# Patient Record
Sex: FEMALE | Race: WHITE | NOT HISPANIC OR LATINO | URBAN - METROPOLITAN AREA
[De-identification: names, ages, dates, MRNs, and addresses within clinical notes are randomized per-mention and may not be internally consistent; named-entity substitution may affect disease eponyms.]

---

## 2017-04-25 ENCOUNTER — IMPORTED ENCOUNTER (OUTPATIENT)
Dept: URBAN - METROPOLITAN AREA CLINIC 50 | Facility: CLINIC | Age: 77
End: 2017-04-25

## 2017-04-25 NOTE — PATIENT DISCUSSION
"""Dr. Latrelle Lefort to review testing with pt. "" ""Reassured patient that intraocular pressures (IOPs) are at target levels and other ocular findings are stable. Continue present management and/or medication(s).  Follow up as directed. """

## 2017-05-01 ENCOUNTER — IMPORTED ENCOUNTER (OUTPATIENT)
Dept: URBAN - METROPOLITAN AREA CLINIC 50 | Facility: CLINIC | Age: 77
End: 2017-05-01

## 2017-05-23 ENCOUNTER — IMPORTED ENCOUNTER (OUTPATIENT)
Dept: URBAN - METROPOLITAN AREA CLINIC 50 | Facility: CLINIC | Age: 77
End: 2017-05-23

## 2017-10-31 ENCOUNTER — IMPORTED ENCOUNTER (OUTPATIENT)
Dept: URBAN - METROPOLITAN AREA CLINIC 50 | Facility: CLINIC | Age: 77
End: 2017-10-31

## 2018-02-27 ENCOUNTER — IMPORTED ENCOUNTER (OUTPATIENT)
Dept: URBAN - METROPOLITAN AREA CLINIC 50 | Facility: CLINIC | Age: 78
End: 2018-02-27

## 2018-05-08 ENCOUNTER — IMPORTED ENCOUNTER (OUTPATIENT)
Dept: URBAN - METROPOLITAN AREA CLINIC 50 | Facility: CLINIC | Age: 78
End: 2018-05-08

## 2018-06-02 NOTE — PATIENT DISCUSSION
"""Cataract(s) are not visually significant for cataract surgery.  Monitor by regular examinations. """ no abrasions, no jaundice, no lesions, no pruritis, and no rashes.

## 2018-06-12 ENCOUNTER — IMPORTED ENCOUNTER (OUTPATIENT)
Dept: URBAN - METROPOLITAN AREA CLINIC 50 | Facility: CLINIC | Age: 78
End: 2018-06-12

## 2018-07-09 ENCOUNTER — IMPORTED ENCOUNTER (OUTPATIENT)
Dept: URBAN - METROPOLITAN AREA CLINIC 50 | Facility: CLINIC | Age: 78
End: 2018-07-09

## 2018-07-11 ENCOUNTER — IMPORTED ENCOUNTER (OUTPATIENT)
Dept: URBAN - METROPOLITAN AREA CLINIC 50 | Facility: CLINIC | Age: 78
End: 2018-07-11

## 2018-07-19 ENCOUNTER — IMPORTED ENCOUNTER (OUTPATIENT)
Dept: URBAN - METROPOLITAN AREA CLINIC 50 | Facility: CLINIC | Age: 78
End: 2018-07-19

## 2018-07-26 ENCOUNTER — IMPORTED ENCOUNTER (OUTPATIENT)
Dept: URBAN - METROPOLITAN AREA CLINIC 50 | Facility: CLINIC | Age: 78
End: 2018-07-26

## 2018-08-07 ENCOUNTER — IMPORTED ENCOUNTER (OUTPATIENT)
Dept: URBAN - METROPOLITAN AREA CLINIC 50 | Facility: CLINIC | Age: 78
End: 2018-08-07

## 2018-08-07 NOTE — PATIENT DISCUSSION
"""S/P IOL OD: SN6AT4 +18.5 @ 14Âº +Femto/Arcs +TM +iStent. Continue post operative instructions and drops per schedule.  Continue glaucoma drops as prescribed."""

## 2018-09-21 ENCOUNTER — IMPORTED ENCOUNTER (OUTPATIENT)
Dept: URBAN - METROPOLITAN AREA CLINIC 50 | Facility: CLINIC | Age: 78
End: 2018-09-21

## 2018-10-31 ENCOUNTER — IMPORTED ENCOUNTER (OUTPATIENT)
Dept: URBAN - METROPOLITAN AREA CLINIC 50 | Facility: CLINIC | Age: 78
End: 2018-10-31

## 2018-11-02 ENCOUNTER — IMPORTED ENCOUNTER (OUTPATIENT)
Dept: URBAN - METROPOLITAN AREA CLINIC 50 | Facility: CLINIC | Age: 78
End: 2018-11-02

## 2018-11-13 ENCOUNTER — IMPORTED ENCOUNTER (OUTPATIENT)
Dept: URBAN - METROPOLITAN AREA CLINIC 50 | Facility: CLINIC | Age: 78
End: 2018-11-13

## 2018-11-13 NOTE — PATIENT DISCUSSION
"""Continue Artificial tears both eyes two - four times a day ."" ""Continue Gel drops both eyes at bedtime . "" ""Continue Warm compresses both eyes twice a day . """

## 2018-11-15 ENCOUNTER — IMPORTED ENCOUNTER (OUTPATIENT)
Dept: URBAN - METROPOLITAN AREA CLINIC 50 | Facility: CLINIC | Age: 78
End: 2018-11-15

## 2018-11-20 ENCOUNTER — IMPORTED ENCOUNTER (OUTPATIENT)
Dept: URBAN - METROPOLITAN AREA CLINIC 50 | Facility: CLINIC | Age: 78
End: 2018-11-20

## 2018-11-30 ENCOUNTER — IMPORTED ENCOUNTER (OUTPATIENT)
Dept: URBAN - METROPOLITAN AREA CLINIC 50 | Facility: CLINIC | Age: 78
End: 2018-11-30

## 2018-12-13 ENCOUNTER — IMPORTED ENCOUNTER (OUTPATIENT)
Dept: URBAN - METROPOLITAN AREA CLINIC 50 | Facility: CLINIC | Age: 78
End: 2018-12-13

## 2019-02-19 ENCOUNTER — IMPORTED ENCOUNTER (OUTPATIENT)
Dept: URBAN - METROPOLITAN AREA CLINIC 50 | Facility: CLINIC | Age: 79
End: 2019-02-19

## 2019-02-19 NOTE — PATIENT DISCUSSION
""" to review testing with patient. "" ""Continue Timolol GFS left eye in the morning ."" ""Continue Travatan Z both eyes at bedtime ."""

## 2019-03-05 ENCOUNTER — IMPORTED ENCOUNTER (OUTPATIENT)
Dept: URBAN - METROPOLITAN AREA CLINIC 50 | Facility: CLINIC | Age: 79
End: 2019-03-05

## 2019-03-05 NOTE — PATIENT DISCUSSION
"""Continue Artificial tears both eyes two - four times a day ."" ""Continue Gel drops both eyes at bedtime . "" ""Continue Warm compresses both eyes twice a day ."" ""Continue Lotemax Gel both eyes two - four times a day

## 2019-09-12 ENCOUNTER — IMPORTED ENCOUNTER (OUTPATIENT)
Dept: URBAN - METROPOLITAN AREA CLINIC 50 | Facility: CLINIC | Age: 79
End: 2019-09-12

## 2019-10-17 ENCOUNTER — IMPORTED ENCOUNTER (OUTPATIENT)
Dept: URBAN - METROPOLITAN AREA CLINIC 50 | Facility: CLINIC | Age: 79
End: 2019-10-17

## 2019-10-17 NOTE — PATIENT DISCUSSION
"""Moderate Primary Open Angle Glaucoma Normal vision: sees adequately in most situations; can see medication labels, newsprint

## 2019-10-23 ENCOUNTER — IMPORTED ENCOUNTER (OUTPATIENT)
Dept: URBAN - METROPOLITAN AREA CLINIC 50 | Facility: CLINIC | Age: 79
End: 2019-10-23

## 2020-03-12 ENCOUNTER — IMPORTED ENCOUNTER (OUTPATIENT)
Dept: URBAN - METROPOLITAN AREA CLINIC 50 | Facility: CLINIC | Age: 80
End: 2020-03-12

## 2020-08-12 NOTE — PATIENT DISCUSSION
The patient feels that the cataract is significantly impacting daily activities and has elected cataract surgery. The risks, benefits, and alternatives to surgery were discussed. The patient elects to proceed with surgery. patient elects pciol OD Basic goal getachew.

## 2020-08-31 ENCOUNTER — IMPORTED ENCOUNTER (OUTPATIENT)
Dept: URBAN - METROPOLITAN AREA CLINIC 50 | Facility: CLINIC | Age: 80
End: 2020-08-31

## 2020-09-10 ENCOUNTER — IMPORTED ENCOUNTER (OUTPATIENT)
Dept: URBAN - METROPOLITAN AREA CLINIC 50 | Facility: CLINIC | Age: 80
End: 2020-09-10

## 2020-09-17 NOTE — PATIENT DISCUSSION
Patient advised of the right to post-operative care by the surgeon. Patient is fully informed of, and agreed to, co-management with their primary optometric physician. Post-operative care by the surgeon is not medically necessary and co-management is clinically appropriate. Patient has received itemization of fees related to cataract surgery. Transfer of care letter completed for the patient. Transfer care of right eye to Dr. Washington Chester on 9/17/2020. Patient instructed to call immediately if any new distortion, blurring, decreased vision or eye pain.

## 2020-09-24 NOTE — PATIENT DISCUSSION
Patient advised of the right to post-operative care by the surgeon. Patient is fully informed of, and agreed to, co-management with their primary optometric physician. Post-operative care by the surgeon is not medically necessary and co-management is clinically appropriate. Patient has received itemization of fees related to cataract surgery. Transfer of care letter completed for the patient. Transfer care of left eye to Dr. Nancy Stevenson on 9/24/2020. Patient instructed to call immediately if any new distortion, blurring, decreased vision or eye pain.

## 2021-01-06 ENCOUNTER — IMPORTED ENCOUNTER (OUTPATIENT)
Dept: URBAN - METROPOLITAN AREA CLINIC 50 | Facility: CLINIC | Age: 81
End: 2021-01-06

## 2021-01-13 ENCOUNTER — IMPORTED ENCOUNTER (OUTPATIENT)
Dept: URBAN - METROPOLITAN AREA CLINIC 50 | Facility: CLINIC | Age: 81
End: 2021-01-13

## 2021-01-15 ENCOUNTER — IMPORTED ENCOUNTER (OUTPATIENT)
Dept: URBAN - METROPOLITAN AREA CLINIC 50 | Facility: CLINIC | Age: 81
End: 2021-01-15

## 2021-03-11 ENCOUNTER — IMPORTED ENCOUNTER (OUTPATIENT)
Dept: URBAN - METROPOLITAN AREA CLINIC 50 | Facility: CLINIC | Age: 81
End: 2021-03-11

## 2021-06-14 ASSESSMENT — TONOMETRY
OD_IOP_MMHG: 13
OD_IOP_MMHG: 14
OS_IOP_MMHG: 16
OS_IOP_MMHG: 14
OS_IOP_MMHG: 15
OS_IOP_MMHG: 14
OD_IOP_MMHG: 16
OD_IOP_MMHG: 15
OS_IOP_MMHG: 14
OD_IOP_MMHG: 16
OD_IOP_MMHG: 15
OS_IOP_MMHG: 17
OS_IOP_MMHG: 16
OS_IOP_MMHG: 16
OS_IOP_MMHG: 15
OD_IOP_MMHG: 16
OD_IOP_MMHG: 13
OD_IOP_MMHG: 12
OD_IOP_MMHG: 12
OS_IOP_MMHG: 15
OS_IOP_MMHG: 17
OD_IOP_MMHG: 14
OD_IOP_MMHG: 13
OS_IOP_MMHG: 13
OD_IOP_MMHG: 16
OS_IOP_MMHG: 16
OS_IOP_MMHG: 16
OS_IOP_MMHG: 15
OS_IOP_MMHG: 13
OD_IOP_MMHG: 11
OD_IOP_MMHG: 18
OS_IOP_MMHG: 15
OS_IOP_MMHG: 14
OS_IOP_MMHG: 14
OS_IOP_MMHG: 15
OS_IOP_MMHG: 15
OS_IOP_MMHG: 16
OS_IOP_MMHG: 16
OD_IOP_MMHG: 13
OD_IOP_MMHG: 13
OD_IOP_MMHG: 12
OD_IOP_MMHG: 14
OS_IOP_MMHG: 14
OD_IOP_MMHG: 14
OD_IOP_MMHG: 16
OS_IOP_MMHG: 18
OD_IOP_MMHG: 13
OS_IOP_MMHG: 15
OS_IOP_MMHG: 15
OD_IOP_MMHG: 16
OD_IOP_MMHG: 17
OS_IOP_MMHG: 17
OS_IOP_MMHG: 16
OD_IOP_MMHG: 13
OD_IOP_MMHG: 17
OS_IOP_MMHG: 15
OD_IOP_MMHG: 17
OS_IOP_MMHG: 15
OD_IOP_MMHG: 15
OD_IOP_MMHG: 15
OS_IOP_MMHG: 16
OS_IOP_MMHG: 15
OD_IOP_MMHG: 11
OD_IOP_MMHG: 14
OD_IOP_MMHG: 16
OS_IOP_MMHG: 16
OD_IOP_MMHG: 15
OS_IOP_MMHG: 16
OD_IOP_MMHG: 14
OD_IOP_MMHG: 14
OD_IOP_MMHG: 12
OD_IOP_MMHG: 13

## 2021-06-14 ASSESSMENT — VISUAL ACUITY
OD_OTHER: 20/70.
OS_OTHER: 20/60. >20/400.
OD_OTHER: >20/400.

## 2021-06-14 ASSESSMENT — PACHYMETRY
OS_CT_UM: 568
OD_CT_UM: 581
OS_CT_UM: 568
OD_CT_UM: 596
OD_CT_UM: 596
OS_CT_UM: 568
OD_CT_UM: 596
OS_CT_UM: 568
OD_CT_UM: 596
OD_CT_UM: 596
OS_CT_UM: 568
OD_CT_UM: 581
OD_CT_UM: 581
OS_CT_UM: 568
OD_CT_UM: 596
OD_CT_UM: 581
OD_CT_UM: 581
OS_CT_UM: 568
OD_CT_UM: 596
OS_CT_UM: 568
OD_CT_UM: 596
OS_CT_UM: 568
OD_CT_UM: 581
OS_CT_UM: 568
OD_CT_UM: 596
OS_CT_UM: 568
OS_CT_UM: 568
OD_CT_UM: 596
OS_CT_UM: 568
OD_CT_UM: 581
OS_CT_UM: 568
OS_CT_UM: 568
OD_CT_UM: 581
OD_CT_UM: 581
OD_CT_UM: 596
OD_CT_UM: 596
OD_CT_UM: 581
OD_CT_UM: 596
OD_CT_UM: 581
OS_CT_UM: 568
OD_CT_UM: 581
OS_CT_UM: 568
OD_CT_UM: 596
OS_CT_UM: 568
OD_CT_UM: 596
OS_CT_UM: 568
OS_CT_UM: 568
OD_CT_UM: 581

## 2021-09-20 ENCOUNTER — PREPPED CHART (OUTPATIENT)
Dept: URBAN - METROPOLITAN AREA CLINIC 49 | Facility: CLINIC | Age: 81
End: 2021-09-20

## 2021-09-23 ENCOUNTER — 6 MONTH FOLLOW-UP (OUTPATIENT)
Dept: URBAN - METROPOLITAN AREA CLINIC 49 | Facility: CLINIC | Age: 81
End: 2021-09-23

## 2021-09-23 DIAGNOSIS — H40.1132: ICD-10-CM

## 2021-09-23 PROCEDURE — 92083 EXTENDED VISUAL FIELD XM: CPT

## 2021-09-23 PROCEDURE — 92133 CPTRZD OPH DX IMG PST SGM ON: CPT

## 2021-09-23 PROCEDURE — 92012 INTRM OPH EXAM EST PATIENT: CPT

## 2021-09-23 ASSESSMENT — TONOMETRY
OD_IOP_MMHG: 11
OD_IOP_MMHG: 15
OS_IOP_MMHG: 14
OS_IOP_MMHG: 15

## 2021-09-23 ASSESSMENT — VISUAL ACUITY
OS_CC: 20/20
OD_CC: 20/25

## 2022-03-24 ENCOUNTER — COMPREHENSIVE EXAM (OUTPATIENT)
Dept: URBAN - METROPOLITAN AREA CLINIC 49 | Facility: CLINIC | Age: 82
End: 2022-03-24

## 2022-03-24 DIAGNOSIS — H35.351: ICD-10-CM

## 2022-03-24 DIAGNOSIS — H35.371: ICD-10-CM

## 2022-03-24 DIAGNOSIS — H40.1132: ICD-10-CM

## 2022-03-24 PROCEDURE — 92015 DETERMINE REFRACTIVE STATE: CPT

## 2022-03-24 PROCEDURE — 92134 CPTRZ OPH DX IMG PST SGM RTA: CPT

## 2022-03-24 PROCEDURE — 92014 COMPRE OPH EXAM EST PT 1/>: CPT

## 2022-03-24 RX ORDER — BRIMONIDINE TARTRATE 2 MG/MG
1 SOLUTION/ DROPS OPHTHALMIC TWICE A DAY
Start: 2022-03-24

## 2022-03-24 ASSESSMENT — VISUAL ACUITY
OD_CC: 20/60+2
OD_SC: 20/100
OS_CC: J1@16"
OS_SC: J5@16"
OD_CC: J1@16"
OS_SC: 20/50
OS_CC: 20/25-1
OD_SC: J7@16"

## 2022-03-24 ASSESSMENT — TONOMETRY
OS_IOP_MMHG: 20
OD_IOP_MMHG: 16
OD_IOP_MMHG: 20
OS_IOP_MMHG: 19

## 2022-03-24 NOTE — PATIENT DISCUSSION
The IOP is in the target range but patient has a drance heme left eye.  recommend patient increase timolol to twice a day OS and start brimonidine twice a day in the left eye.  continue travaprost at bedtime in both eyes.  will recheck iop in 3 weeks with dilation to scheck the drance heme.

## 2022-03-24 NOTE — PATIENT DISCUSSION
Discussed with patient that the lotemax may being bring iop up higher.  offered patient to stop lotemax.

## 2022-04-14 ENCOUNTER — FOLLOW UP (OUTPATIENT)
Dept: URBAN - METROPOLITAN AREA CLINIC 49 | Facility: CLINIC | Age: 82
End: 2022-04-14

## 2022-04-14 DIAGNOSIS — H40.1132: ICD-10-CM

## 2022-04-14 PROCEDURE — 92012 INTRM OPH EXAM EST PATIENT: CPT

## 2022-04-14 ASSESSMENT — VISUAL ACUITY
OS_CC: 20/20
OD_CC: 20/30+2

## 2022-04-14 ASSESSMENT — TONOMETRY
OS_IOP_MMHG: 15
OD_IOP_MMHG: 12
OS_IOP_MMHG: 14
OD_IOP_MMHG: 08

## 2022-10-13 ENCOUNTER — FOLLOW UP (OUTPATIENT)
Dept: URBAN - METROPOLITAN AREA CLINIC 49 | Facility: CLINIC | Age: 82
End: 2022-10-13

## 2022-10-13 DIAGNOSIS — H35.351: ICD-10-CM

## 2022-10-13 DIAGNOSIS — H35.371: ICD-10-CM

## 2022-10-13 DIAGNOSIS — H40.1132: ICD-10-CM

## 2022-10-13 DIAGNOSIS — H47.092: ICD-10-CM

## 2022-10-13 PROCEDURE — 92012 INTRM OPH EXAM EST PATIENT: CPT

## 2022-10-13 ASSESSMENT — VISUAL ACUITY
OS_CC: 20/25-2
OD_CC: 20/25-1

## 2022-10-13 ASSESSMENT — TONOMETRY
OD_IOP_MMHG: 14
OS_IOP_MMHG: 15
OS_IOP_MMHG: 16
OD_IOP_MMHG: 10

## 2022-10-13 NOTE — PATIENT DISCUSSION
Instructed to call immediately if any new distortion, blurring, decreased vision or eye pain.
Patient has a drance heme in the left eye.  will adjust medications.
Recommended observation.
The IOP is in the target range. Continue current drop therapy. Follow up in 6 months for Comprehensive exam and refraction.
Statement Selected

## 2023-04-20 ENCOUNTER — COMPREHENSIVE EXAM (OUTPATIENT)
Dept: URBAN - METROPOLITAN AREA CLINIC 49 | Facility: CLINIC | Age: 83
End: 2023-04-20

## 2023-04-20 DIAGNOSIS — H43.813: ICD-10-CM

## 2023-04-20 DIAGNOSIS — H35.371: ICD-10-CM

## 2023-04-20 DIAGNOSIS — H40.1132: ICD-10-CM

## 2023-04-20 DIAGNOSIS — H47.092: ICD-10-CM

## 2023-04-20 PROCEDURE — 92015 DETERMINE REFRACTIVE STATE: CPT

## 2023-04-20 PROCEDURE — 92014 COMPRE OPH EXAM EST PT 1/>: CPT

## 2023-04-20 PROCEDURE — 92134 CPTRZ OPH DX IMG PST SGM RTA: CPT

## 2023-04-20 ASSESSMENT — VISUAL ACUITY
OS_CC: J1+
OD_CC: J1+
OU_CC: 20/20
OD_CC: 20/20-2
OU_CC: J1+
OS_CC: 20/25

## 2023-04-20 ASSESSMENT — TONOMETRY
OD_IOP_MMHG: 09
OS_IOP_MMHG: 13
OS_IOP_MMHG: 12
OD_IOP_MMHG: 13

## 2023-09-14 NOTE — PATIENT DISCUSSION
Good postoperative appearance. 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 The patient was prescribed by the other provider a chemo cream to treat the remaining cancer, she declined due to high cost off medication.

## 2023-11-02 ENCOUNTER — FOLLOW UP (OUTPATIENT)
Dept: URBAN - METROPOLITAN AREA CLINIC 49 | Facility: LOCATION | Age: 83
End: 2023-11-02

## 2023-11-02 DIAGNOSIS — H35.371: ICD-10-CM

## 2023-11-02 DIAGNOSIS — H04.123: ICD-10-CM

## 2023-11-02 DIAGNOSIS — H43.813: ICD-10-CM

## 2023-11-02 DIAGNOSIS — H40.1132: ICD-10-CM

## 2023-11-02 PROCEDURE — 99213 OFFICE O/P EST LOW 20 MIN: CPT

## 2023-11-02 PROCEDURE — 92083 EXTENDED VISUAL FIELD XM: CPT

## 2023-11-02 PROCEDURE — 92133 CPTRZD OPH DX IMG PST SGM ON: CPT

## 2023-11-02 ASSESSMENT — TONOMETRY
OS_IOP_MMHG: 14
OD_IOP_MMHG: 10
OS_IOP_MMHG: 13
OD_IOP_MMHG: 15

## 2023-11-02 ASSESSMENT — VISUAL ACUITY
OS_CC: 20/25-2
OD_CC: 20/20-1

## 2024-05-30 ENCOUNTER — COMPREHENSIVE EXAM (OUTPATIENT)
Dept: URBAN - METROPOLITAN AREA CLINIC 49 | Facility: LOCATION | Age: 84
End: 2024-05-30

## 2024-05-30 DIAGNOSIS — H35.371: ICD-10-CM

## 2024-05-30 DIAGNOSIS — H04.123: ICD-10-CM

## 2024-05-30 DIAGNOSIS — H43.813: ICD-10-CM

## 2024-05-30 DIAGNOSIS — H40.1132: ICD-10-CM

## 2024-05-30 PROCEDURE — 99214 OFFICE O/P EST MOD 30 MIN: CPT

## 2024-05-30 PROCEDURE — 92134 CPTRZ OPH DX IMG PST SGM RTA: CPT

## 2024-05-30 RX ORDER — POVIDONE 2.5 MG/.5G: SOLUTION, GEL FORMING / DROPS OPHTHALMIC

## 2024-05-30 ASSESSMENT — VISUAL ACUITY
OS_CC: 20/25
OU_CC: J1
OD_CC: 20/25

## 2024-05-30 ASSESSMENT — TONOMETRY
OS_IOP_MMHG: 11
OS_IOP_MMHG: 12
OD_IOP_MMHG: 14
OD_IOP_MMHG: 10

## 2024-06-20 ENCOUNTER — FOLLOW UP (OUTPATIENT)
Dept: URBAN - METROPOLITAN AREA CLINIC 49 | Facility: LOCATION | Age: 84
End: 2024-06-20

## 2024-06-20 DIAGNOSIS — H40.1132: ICD-10-CM

## 2024-06-20 DIAGNOSIS — H04.123: ICD-10-CM

## 2024-06-20 PROCEDURE — 68761 CLOSE TEAR DUCT OPENING: CPT

## 2024-06-20 PROCEDURE — 99213 OFFICE O/P EST LOW 20 MIN: CPT | Mod: 25

## 2024-06-20 RX ORDER — BIMATOPROST 0.1 MG/ML
1 SOLUTION/ DROPS OPHTHALMIC EVERY EVENING
Start: 2024-06-20

## 2024-06-20 ASSESSMENT — VISUAL ACUITY
OD_CC: 20/20-1
OS_CC: 20/25

## 2024-06-20 ASSESSMENT — TONOMETRY
OD_IOP_MMHG: 14
OS_IOP_MMHG: 12
OD_IOP_MMHG: 10
OS_IOP_MMHG: 13

## 2024-07-03 ENCOUNTER — FOLLOW UP (OUTPATIENT)
Dept: URBAN - METROPOLITAN AREA CLINIC 49 | Facility: LOCATION | Age: 84
End: 2024-07-03

## 2024-07-03 DIAGNOSIS — H04.123: ICD-10-CM

## 2024-07-03 DIAGNOSIS — H40.1132: ICD-10-CM

## 2024-07-03 PROCEDURE — 99213 OFFICE O/P EST LOW 20 MIN: CPT

## 2024-07-03 ASSESSMENT — TONOMETRY
OD_IOP_MMHG: 10
OS_IOP_MMHG: 11
OD_IOP_MMHG: 14
OS_IOP_MMHG: 12

## 2024-07-03 ASSESSMENT — VISUAL ACUITY
OD_CC: 20/25
OS_CC: 20/25

## 2025-01-02 ENCOUNTER — COMPREHENSIVE EXAM (OUTPATIENT)
Age: 85
End: 2025-01-02

## 2025-01-02 DIAGNOSIS — H43.813: ICD-10-CM

## 2025-01-02 DIAGNOSIS — H40.1132: ICD-10-CM

## 2025-01-02 DIAGNOSIS — H35.371: ICD-10-CM

## 2025-01-02 DIAGNOSIS — H04.123: ICD-10-CM

## 2025-01-02 PROCEDURE — 99214 OFFICE O/P EST MOD 30 MIN: CPT

## 2025-01-02 PROCEDURE — 92134 CPTRZ OPH DX IMG PST SGM RTA: CPT

## 2025-07-16 ENCOUNTER — FOLLOW UP (OUTPATIENT)
Age: 85
End: 2025-07-16

## 2025-07-16 DIAGNOSIS — H04.123: ICD-10-CM

## 2025-07-16 DIAGNOSIS — H52.4: ICD-10-CM

## 2025-07-16 DIAGNOSIS — H40.1132: ICD-10-CM

## 2025-07-16 PROCEDURE — 99213 OFFICE O/P EST LOW 20 MIN: CPT

## 2025-07-16 PROCEDURE — 92015 DETERMINE REFRACTIVE STATE: CPT

## 2025-07-16 PROCEDURE — 92133 CPTRZD OPH DX IMG PST SGM ON: CPT

## 2025-07-16 PROCEDURE — 92083 EXTENDED VISUAL FIELD XM: CPT
